# Patient Record
Sex: FEMALE | Race: WHITE
[De-identification: names, ages, dates, MRNs, and addresses within clinical notes are randomized per-mention and may not be internally consistent; named-entity substitution may affect disease eponyms.]

---

## 2020-08-12 ENCOUNTER — HOSPITAL ENCOUNTER (OUTPATIENT)
Dept: HOSPITAL 62 - END | Age: 30
Discharge: HOME | End: 2020-08-12
Attending: INTERNAL MEDICINE
Payer: COMMERCIAL

## 2020-08-12 VITALS — SYSTOLIC BLOOD PRESSURE: 130 MMHG | DIASTOLIC BLOOD PRESSURE: 68 MMHG

## 2020-08-12 DIAGNOSIS — F12.90: ICD-10-CM

## 2020-08-12 DIAGNOSIS — K29.50: Primary | ICD-10-CM

## 2020-08-12 DIAGNOSIS — K92.1: ICD-10-CM

## 2020-08-12 DIAGNOSIS — Z79.899: ICD-10-CM

## 2020-08-12 DIAGNOSIS — R19.4: ICD-10-CM

## 2020-08-12 DIAGNOSIS — Z03.818: ICD-10-CM

## 2020-08-12 PROCEDURE — 88305 TISSUE EXAM BY PATHOLOGIST: CPT

## 2020-08-12 PROCEDURE — C9803 HOPD COVID-19 SPEC COLLECT: HCPCS

## 2020-08-12 PROCEDURE — 45380 COLONOSCOPY AND BIOPSY: CPT

## 2020-08-12 PROCEDURE — 87635 SARS-COV-2 COVID-19 AMP PRB: CPT

## 2020-08-12 PROCEDURE — 00813 ANES UPR LWR GI NDSC PX: CPT

## 2020-08-12 PROCEDURE — 43239 EGD BIOPSY SINGLE/MULTIPLE: CPT

## 2020-08-12 NOTE — OPERATIVE REPORT
Operative Report


DATE OF SURGERY: 08/12/20


Operative Report: 





The risk, benefits and alternatives of the procedure including the risk of 

bleeding, perforation requiring surgery were explained to the patient in detail 

and informed consent has been obtained.  Patient is placed in a left, lateral 

decubital position.  Timeout was called.  Propofol medication is administered.  

Rectal examination is done which did not reveal any masses, tears or fissures.  

An Olympus videoscope was introduced into the patient's rectum.  Scope was then 

carefully advanced all the way to the cecum.  Cecum was identified by the usual 

anatomical landmarks including the ileocecal valve as well as the appendiceal 

office.  Photodocumentation is obtained.  The scope was then sequentially pulled

back via the rest segments of the colon including the ascending colon, hepatic 

flexure, transverse colon, splenic flexure, descending colon finally into the 

rectosigmoid portions of the colon.  Retroflexion maneuver is performed.


The risks benefits and alternatives of the procedure explained to the patient in

detail and informed consent is obtained.A  GIF Olympus video scope was inserted 

into the patient's mouth and hypopharynx, the esophagus is identified intubated 

and insufflated, the scope was then advanced through the esophagus stomach and 

duodenum, retroflexion maneuver is done, the esophagus stomach and first and 

second portions of the duodenum examined


PREOPERATIVE DIAGNOSIS: Epigastric pain.  Change of bowel habits


POSTOPERATIVE DIAGNOSIS: Biopsies obtained in the terminal ileum rule out 

Crohn's disease.  Gastritis status post biopsy


OPERATION: Colonoscopy with biopsy.  EGD with biopsy


SURGEON: JORDAN BRITO


ANESTHESIA: LMAC


TISSUE REMOVED OR ALTERED: As noted above.


COMPLICATIONS: 





None.


ESTIMATED BLOOD LOSS: None.


INTRAOPERATIVE FINDINGS: As noted above.


PROCEDURE: 





Patient tolerated the procedure well.


No immediate postprocedure complications are noted.


Patient is discharged in good condition.


Discharge date 8/12/2020.


Discharge diet: Regular.


Discharge activity: Regular.


2 to 3-week follow-up to discuss findings.


Patient is instructed to call the office or proceed to the emergency room should

there be any further problems or questions.


Wait on the pathology.

## 2020-09-23 ENCOUNTER — HOSPITAL ENCOUNTER (OUTPATIENT)
Dept: HOSPITAL 62 - RAD | Age: 30
End: 2020-09-23
Attending: INTERNAL MEDICINE
Payer: COMMERCIAL

## 2020-09-23 DIAGNOSIS — R11.14: Primary | ICD-10-CM

## 2020-09-23 PROCEDURE — 78264 GASTRIC EMPTYING IMG STUDY: CPT

## 2020-09-23 PROCEDURE — A9541 TC99M SULFUR COLLOID: HCPCS

## 2020-09-23 NOTE — RADIOLOGY REPORT (SQ)
EXAM DESCRIPTION:  NM GASTRIC EMPTYING STUDY



IMAGES COMPLETED DATE/TIME:  9/23/2020 12:47 pm



REASON FOR STUDY:  R11.14 BILIOUS VOMITING R11.14  BILIOUS VOMITING



COMPARISON:  None.



RADIONUCLIDE AND DOSE:  2.03 millicuries Tc-99m Sulfur Colloid.

A wide variety of solid foods have been used.

The route of agent administration: Oral.



TECHNIQUE:  1 minute serial static imaging performed at time of meal, 1 hour, 2 hours, 3 hours, and 4
 hours as needed.  Once stomach reaches 90% emptying, the test is complete. Image intensity values pl
otted with respect to time with linear regression algorithm.



LIMITATIONS:  None.



FINDINGS:  Patient was observed for 4 hours.

Immediate post meal serves as baseline.

Gastric emptying at 30 minutes was 41%.

Gastric emptying at 60 minutes was 54%

Gastric emptying at 120 minutes was 69%.

Gastric emptying at 240 minutes was 85.2%.

Normal values:

60 minutes: 30-90% retained. If less than 30%, abnormally rapid emptying. If greater than 90%, delaye
d gastric emptying.

120 minutes: <60% retained. If greater than 60%, delayed gastric emptying.

240 minutes: <10% retained. If greater than 10%, delayed gastric emptying.



IMPRESSION:  NORMAL GASTRIC EMPTYING.



TECHNICAL DOCUMENTATION:  JOB ID:  [id number]

 2011 [employer]- All Rights Reserved                           rev-5/[id number]



Reading location - IP/workstation name: [email or web address]